# Patient Record
Sex: FEMALE | URBAN - METROPOLITAN AREA
[De-identification: names, ages, dates, MRNs, and addresses within clinical notes are randomized per-mention and may not be internally consistent; named-entity substitution may affect disease eponyms.]

---

## 2019-07-17 ENCOUNTER — APPOINTMENT (OUTPATIENT)
Age: 49
Setting detail: DERMATOLOGY
End: 2019-07-17

## 2019-07-17 DIAGNOSIS — Z41.9 ENCOUNTER FOR PROCEDURE FOR PURPOSES OTHER THAN REMEDYING HEALTH STATE, UNSPECIFIED: ICD-10-CM

## 2019-07-17 PROCEDURE — OTHER BOTOX: OTHER

## 2019-07-17 NOTE — PROCEDURE: BOTOX
Consent: Assessment :\\n“Romie Aesthetics Scales”\\n-Wrinkles= mild, Lines present at rest= mild-fine, Folds= mild, Volume Loss & Skin Laxity= mild.  \\n-Glogau Wrinkle Scale:  II-III\\n-Treatment areas reviewed with the patient while holding a hand-held mirror.  \\n-The patient has realistic expectations. \\n-Written consent obtained. Patient verbalized understanding. Questions answered. See written consent on file. \\n-Topical anesthesia  L23%T7% was applied for 10 minutes on upper lip;  Lot #[id number], exp 9/12/19 \\n-The skin was prepped with alcohol, skin markings made and injections provided. \\n-The treatment was administered to the area(s) above.\\n-The patient tolerated the procedure well w/o incident.  \\n-Additional product (Botox) may be necessary for optimal correction following treatment and/or maintenance.\\n\\n-Pt has naturally occurring heaviness in L brow compared to R.\\n-Patient instructed to not lie down for 4-6 hours.  Patient instructed not to massage or manipulate treatment areas (avoid facial treatments or Clarisonic Brush) and limit physical activity for 24 hours.\\n-Recommended:  Voluma temples, cheeks, chin and lateral jawline for structure.\\n-Instructed to avoid: NSAIDs, Vitamin E, Fish oil, Ginko Balboa and alcohol 5-7 days before filler treatment Consent: Assessment :\\n“Romie Aesthetics Scales”\\n-Wrinkles= mild, Lines present at rest= mild-fine, Folds= mild, Volume Loss & Skin Laxity= mild.  \\n-Glogau Wrinkle Scale:  II-III\\n-Treatment areas reviewed with the patient while holding a hand-held mirror.  \\n-The patient has realistic expectations. \\n-Written consent obtained. Patient verbalized understanding. Questions answered. See written consent on file. \\n-Topical anesthesia  L23%T7% was applied for 10 minutes on upper lip;  Lot #[id number], exp 9/12/19 \\n-The skin was prepped with alcohol, skin markings made and injections provided. \\n-The treatment was administered to the area(s) above.\\n-The patient tolerated the procedure well w/o incident.  \\n-Additional product (Botox) may be necessary for optimal correction following treatment and/or maintenance.\\n\\n-Pt has naturally occurring heaviness in L brow compared to R.\\n-Patient instructed to not lie down for 4-6 hours.  Patient instructed not to massage or manipulate treatment areas (avoid facial treatments or Clarisonic Brush) and limit physical activity for 24 hours.\\n-Recommended:  Voluma temples, cheeks, chin and lateral jawline for structure.\\n-Instructed to avoid: NSAIDs, Vitamin E, Fish oil, Ginko Balboa and alcohol 5-7 days before filler treatment

## 2019-10-15 ENCOUNTER — APPOINTMENT (OUTPATIENT)
Age: 49
Setting detail: DERMATOLOGY
End: 2019-10-15

## 2019-12-03 ENCOUNTER — APPOINTMENT (OUTPATIENT)
Age: 49
Setting detail: DERMATOLOGY
End: 2019-12-04

## 2019-12-03 DIAGNOSIS — Z41.9 ENCOUNTER FOR PROCEDURE FOR PURPOSES OTHER THAN REMEDYING HEALTH STATE, UNSPECIFIED: ICD-10-CM

## 2019-12-03 PROCEDURE — OTHER BOTOX: OTHER

## 2019-12-03 NOTE — PROCEDURE: BOTOX
Consent: Assessment :\\n“Romie Aesthetics Scales”\\n-Wrinkles= mild, Lines present at rest= mild-fine, Folds= mild, Volume Loss & Skin Laxity= mild.  \\n-Glogau Wrinkle Scale:  II-III\\n-Pt has naturally occurring heaviness in L brow compared to R.\\n\\n-Treatment areas reviewed with the patient while holding a hand-held mirror.  \\n-The patient has realistic expectations. \\n\\n-Informed patient that lower face Botox injections are considered off-label.\\n\\n-Written consent obtained. Patient verbalized understanding. Questions answered. See written consent on file. \\n\\n-Topical anesthesia  L23%T7% was applied for 10 minutes on upper lip;  Lot #[id number], exp 9/12/19 \\n-The skin was prepped with alcohol, skin markings made and injections provided. \\n-The treatment was administered to the area(s) noted.\\n-The patient tolerated the procedure well w/o incident.  \\n-Additional product (Botox) may be necessary for optimal correction following treatment and/or maintenance.\\n-Patient instructed to not lie down for 4-6 hours.  Patient instructed not to massage or manipulate treatment areas (avoid facial treatments or Clarisonic Brush) and limit physical activity for 24 hours.\\n\\n-Calipers used\\n\\n-Recommended:  Voluma temples, cheeks, chin and lateral jawline for structure.\\n-Instructed to avoid: NSAIDs, Vitamin E, Fish oil, Ginkgo Biloba  and alcohol 5-7 days before filler treatment\\n-Skin Care: Obagi hydroquinone, tretinoin Consent: Assessment :\\n“Romie Aesthetics Scales”\\n-Wrinkles= mild, Lines present at rest= mild-fine, Folds= mild, Volume Loss & Skin Laxity= mild.  \\n-Glogau Wrinkle Scale:  II-III\\n-Pt has naturally occurring heaviness in L brow compared to R.\\n\\n-Treatment areas reviewed with the patient while holding a hand-held mirror.  \\n-The patient has realistic expectations. \\n\\n-Informed patient that lower face Botox injections are considered off-label.\\n\\n-Written consent obtained. Patient verbalized understanding. Questions answered. See written consent on file. \\n\\n-Topical anesthesia  L23%T7% was applied for 10 minutes on upper lip;  Lot #[id number], exp 9/12/19 \\n-The skin was prepped with alcohol, skin markings made and injections provided. \\n-The treatment was administered to the area(s) noted.\\n-The patient tolerated the procedure well w/o incident.  \\n-Additional product (Botox) may be necessary for optimal correction following treatment and/or maintenance.\\n-Patient instructed to not lie down for 4-6 hours.  Patient instructed not to massage or manipulate treatment areas (avoid facial treatments or Clarisonic Brush) and limit physical activity for 24 hours.\\n\\n-Calipers used\\n\\n-Recommended:  Voluma temples, cheeks, chin and lateral jawline for structure.\\n-Instructed to avoid: NSAIDs, Vitamin E, Fish oil, Ginkgo Biloba  and alcohol 5-7 days before filler treatment\\n-Skin Care: Obagi hydroquinone, tretinoin

## 2020-02-12 ENCOUNTER — APPOINTMENT (OUTPATIENT)
Age: 50
Setting detail: DERMATOLOGY
End: 2020-02-12

## 2020-02-12 DIAGNOSIS — Z41.9 ENCOUNTER FOR PROCEDURE FOR PURPOSES OTHER THAN REMEDYING HEALTH STATE, UNSPECIFIED: ICD-10-CM

## 2020-02-12 PROCEDURE — OTHER JUVEDERM VOLUMA XC INJECTION: OTHER

## 2020-02-12 NOTE — PROCEDURE: JUVEDERM VOLUMA XC INJECTION
Price (Use Numbers Only, No Special Characters Or $): 8455 Price (Use Numbers Only, No Special Characters Or $): 8518

## 2020-02-12 NOTE — PROCEDURE: JUVEDERM VOLUMA XC INJECTION
Consent: -Assessment: \\n(“Romie Aesthetics Scales”) Wrinkles= mild, Lines Present at Rest= mild, Folds= mild, Volume Loss & Skin Laxity=mild. \\nGlogau Wrinkle Scale= I-II \\n-Treatment areas reviewed with patient holding a hand held mirror. The patient has realistic expectations.  \\n-The patient was informed that the jawline, chin and tear troughs with the BD syringe micro-droplet technique are off-label.\\n-Calipers used\\n-Written consent obtained. Questions answered. Patient verbalized understanding of the content.  See written informed consent on file. \\n-The skin was prepped with with alcohol and Puracyn prior to treatment. Skin markings made.\\n-The filler was administered to the treatment areas noted.  \\n-Aseptic technique was maintained throughout treatment with Chlorhexadine  and Puracyn wipes.\\n-The patient tolerated the procedure well w/o incident. \\n-Additional product (hyaluronic ) may be necessary for optimal correction following treatment and/or maintenance.\\n-Ice provided post treatment for 2 to 5 minutes and or to take home. \\n-Patient instructed to apply ice 20 minutes on, 20 minutes off while awake for one to 1 to 2 days to reduce swelling. Avoid massage in the area. An over-the-counter antihistamine may be beneficial and was recommended to help with swelling. Patient instructed to notify clinic if any bruising worsens, travels or becomes painful.\\n-Skin Care:  recommended and sample provided of the Epionce Renewal Lite lotion.\\n-Discussed other treatment recommendations for neck area.  She has done a series of Kybella, I recommended 1 more vial submental to clean any remaining fat that could be causing the some of the minor fullness.  I had also suggested trying 10-15 units medially in playtsmal bands and Thermage every 2-3 years for skin tightening of the neck and jawline following these other recommendations.\\n-Plan 2nd syringe of Voluma along lateral jawline in the near future. \\n -Instructed to avoid NSAIDS, Vitamin E, Fish Oil, Ginkgo Biloba and alcohol 5-7 days before filler treatment.\\n-20% [employer] discount \\n-2nd syringe of Voluma; used 0.45mL - house goods N/C

## 2020-02-25 ENCOUNTER — APPOINTMENT (OUTPATIENT)
Age: 50
Setting detail: DERMATOLOGY
End: 2020-02-25

## 2020-06-04 ENCOUNTER — APPOINTMENT (OUTPATIENT)
Age: 50
Setting detail: DERMATOLOGY
End: 2020-06-04

## 2020-06-04 DIAGNOSIS — Z41.9 ENCOUNTER FOR PROCEDURE FOR PURPOSES OTHER THAN REMEDYING HEALTH STATE, UNSPECIFIED: ICD-10-CM

## 2020-06-04 PROCEDURE — OTHER BOTOX: OTHER

## 2020-06-04 NOTE — PROCEDURE: BOTOX
Right Periorbital Units: 0
Show Additional Area 3: Yes
Show Ucl Units: No
Additional Area 2 Location: Nasalis
Dilution (U/0.1 Cc): 4
Additional Area 3 Location: [facility]
Detail Level: Detailed
Additional Area 6 Location: TOTAL UNITS
Additional Area 5 Location: [facility]
Additional Area 4 Location: Lips (O.O.)
Additional Area 1 Location: \"Chemical Brow Lift\"
Additional Area 6 Units: 100
Consent: ***DOCUMENTED ON PAPER _ SEE SCANNED DOCUMENTS\\n\\nBOTOX  - Treatment Performed Today.
Additional Area 5 Units: 3

## 2021-05-28 ENCOUNTER — RECORDS - HEALTHEAST (OUTPATIENT)
Dept: ADMINISTRATIVE | Facility: CLINIC | Age: 51
End: 2021-05-28

## 2023-12-04 ENCOUNTER — HOSPITAL ENCOUNTER (EMERGENCY)
Facility: CLINIC | Age: 53
Discharge: HOME | End: 2023-12-04
Attending: EMERGENCY MEDICINE | Admitting: EMERGENCY MEDICINE
Payer: COMMERCIAL

## 2023-12-04 ENCOUNTER — APPOINTMENT (OUTPATIENT)
Dept: CT IMAGING | Facility: CLINIC | Age: 53
End: 2023-12-04
Attending: EMERGENCY MEDICINE
Payer: COMMERCIAL

## 2023-12-04 VITALS
RESPIRATION RATE: 16 BRPM | BODY MASS INDEX: 21.47 KG/M2 | TEMPERATURE: 98.1 F | HEIGHT: 70 IN | WEIGHT: 150 LBS | SYSTOLIC BLOOD PRESSURE: 120 MMHG | OXYGEN SATURATION: 100 % | HEART RATE: 71 BPM | DIASTOLIC BLOOD PRESSURE: 65 MMHG

## 2023-12-04 DIAGNOSIS — N12 PYELITIS: ICD-10-CM

## 2023-12-04 LAB
ALBUMIN SERPL BCG-MCNC: 4.2 G/DL (ref 3.5–5.2)
ALBUMIN UR-MCNC: 10 MG/DL
ALP SERPL-CCNC: 65 U/L (ref 40–150)
ALT SERPL W P-5'-P-CCNC: 14 U/L (ref 0–50)
ANION GAP SERPL CALCULATED.3IONS-SCNC: 9 MMOL/L (ref 7–15)
APPEARANCE UR: CLEAR
AST SERPL W P-5'-P-CCNC: 18 U/L (ref 0–45)
BILIRUB SERPL-MCNC: 0.2 MG/DL
BILIRUB UR QL STRIP: NEGATIVE
BUN SERPL-MCNC: 11.8 MG/DL (ref 6–20)
CALCIUM SERPL-MCNC: 8.9 MG/DL (ref 8.6–10)
CHLORIDE SERPL-SCNC: 103 MMOL/L (ref 98–107)
COLOR UR AUTO: ABNORMAL
CREAT SERPL-MCNC: 0.77 MG/DL (ref 0.51–0.95)
DEPRECATED HCO3 PLAS-SCNC: 26 MMOL/L (ref 22–29)
EGFRCR SERPLBLD CKD-EPI 2021: >90 ML/MIN/1.73M2
GLUCOSE SERPL-MCNC: 98 MG/DL (ref 70–99)
GLUCOSE UR STRIP-MCNC: NEGATIVE MG/DL
HGB UR QL STRIP: ABNORMAL
KETONES UR STRIP-MCNC: NEGATIVE MG/DL
LEUKOCYTE ESTERASE UR QL STRIP: ABNORMAL
NITRATE UR QL: NEGATIVE
PH UR STRIP: 6 [PH] (ref 5–7)
POTASSIUM SERPL-SCNC: 3.8 MMOL/L (ref 3.4–5.3)
PROT SERPL-MCNC: 7.3 G/DL (ref 6.4–8.3)
RBC URINE: 6 /HPF
SODIUM SERPL-SCNC: 138 MMOL/L (ref 135–145)
SP GR UR STRIP: 1 (ref 1–1.03)
SQUAMOUS EPITHELIAL: <1 /HPF
UROBILINOGEN UR STRIP-MCNC: NORMAL MG/DL
WBC URINE: 108 /HPF

## 2023-12-04 PROCEDURE — 250N000013 HC RX MED GY IP 250 OP 250 PS 637: Performed by: EMERGENCY MEDICINE

## 2023-12-04 PROCEDURE — 96365 THER/PROPH/DIAG IV INF INIT: CPT | Mod: 59

## 2023-12-04 PROCEDURE — 36415 COLL VENOUS BLD VENIPUNCTURE: CPT | Performed by: EMERGENCY MEDICINE

## 2023-12-04 PROCEDURE — 250N000011 HC RX IP 250 OP 636: Performed by: EMERGENCY MEDICINE

## 2023-12-04 PROCEDURE — 99285 EMERGENCY DEPT VISIT HI MDM: CPT | Mod: 25

## 2023-12-04 PROCEDURE — 87086 URINE CULTURE/COLONY COUNT: CPT | Performed by: EMERGENCY MEDICINE

## 2023-12-04 PROCEDURE — 96361 HYDRATE IV INFUSION ADD-ON: CPT

## 2023-12-04 PROCEDURE — 96375 TX/PRO/DX INJ NEW DRUG ADDON: CPT

## 2023-12-04 PROCEDURE — 80053 COMPREHEN METABOLIC PANEL: CPT | Performed by: EMERGENCY MEDICINE

## 2023-12-04 PROCEDURE — 258N000003 HC RX IP 258 OP 636: Performed by: EMERGENCY MEDICINE

## 2023-12-04 PROCEDURE — 250N000011 HC RX IP 250 OP 636: Mod: JZ | Performed by: EMERGENCY MEDICINE

## 2023-12-04 PROCEDURE — 250N000009 HC RX 250: Performed by: EMERGENCY MEDICINE

## 2023-12-04 PROCEDURE — 74177 CT ABD & PELVIS W/CONTRAST: CPT

## 2023-12-04 PROCEDURE — 81001 URINALYSIS AUTO W/SCOPE: CPT | Performed by: EMERGENCY MEDICINE

## 2023-12-04 RX ORDER — ONDANSETRON 2 MG/ML
4 INJECTION INTRAMUSCULAR; INTRAVENOUS ONCE
Status: COMPLETED | OUTPATIENT
Start: 2023-12-04 | End: 2023-12-04

## 2023-12-04 RX ORDER — KETOROLAC TROMETHAMINE 15 MG/ML
15 INJECTION, SOLUTION INTRAMUSCULAR; INTRAVENOUS ONCE
Status: COMPLETED | OUTPATIENT
Start: 2023-12-04 | End: 2023-12-04

## 2023-12-04 RX ORDER — CEFTRIAXONE 2 G/1
2 INJECTION, POWDER, FOR SOLUTION INTRAMUSCULAR; INTRAVENOUS ONCE
Status: COMPLETED | OUTPATIENT
Start: 2023-12-04 | End: 2023-12-04

## 2023-12-04 RX ORDER — ACETAMINOPHEN 325 MG/1
975 TABLET ORAL ONCE
Status: COMPLETED | OUTPATIENT
Start: 2023-12-04 | End: 2023-12-04

## 2023-12-04 RX ORDER — CEFPODOXIME PROXETIL 200 MG/1
200 TABLET, FILM COATED ORAL 2 TIMES DAILY
Qty: 20 TABLET | Refills: 0 | Status: SHIPPED | OUTPATIENT
Start: 2023-12-04 | End: 2023-12-14

## 2023-12-04 RX ORDER — ONDANSETRON 4 MG/1
4 TABLET, ORALLY DISINTEGRATING ORAL EVERY 8 HOURS PRN
Qty: 10 TABLET | Refills: 0 | Status: SHIPPED | OUTPATIENT
Start: 2023-12-04 | End: 2023-12-07

## 2023-12-04 RX ORDER — IOPAMIDOL 755 MG/ML
75 INJECTION, SOLUTION INTRAVASCULAR ONCE
Status: COMPLETED | OUTPATIENT
Start: 2023-12-04 | End: 2023-12-04

## 2023-12-04 RX ORDER — KETOROLAC TROMETHAMINE 15 MG/ML
15 INJECTION, SOLUTION INTRAMUSCULAR; INTRAVENOUS ONCE
Status: DISCONTINUED | OUTPATIENT
Start: 2023-12-04 | End: 2023-12-04

## 2023-12-04 RX ADMIN — SODIUM CHLORIDE 1000 ML: 9 INJECTION, SOLUTION INTRAVENOUS at 17:44

## 2023-12-04 RX ADMIN — KETOROLAC TROMETHAMINE 15 MG: 15 INJECTION, SOLUTION INTRAMUSCULAR; INTRAVENOUS at 17:43

## 2023-12-04 RX ADMIN — ONDANSETRON 4 MG: 2 INJECTION INTRAMUSCULAR; INTRAVENOUS at 17:43

## 2023-12-04 RX ADMIN — CEFTRIAXONE SODIUM 2 G: 2 INJECTION, POWDER, FOR SOLUTION INTRAMUSCULAR; INTRAVENOUS at 20:41

## 2023-12-04 RX ADMIN — IOPAMIDOL 75 ML: 755 INJECTION, SOLUTION INTRAVENOUS at 19:09

## 2023-12-04 RX ADMIN — SODIUM CHLORIDE 62 ML: 9 INJECTION, SOLUTION INTRAVENOUS at 19:09

## 2023-12-04 RX ADMIN — ACETAMINOPHEN 975 MG: 325 TABLET, FILM COATED ORAL at 20:36

## 2023-12-04 ASSESSMENT — ACTIVITIES OF DAILY LIVING (ADL)
ADLS_ACUITY_SCORE: 35
ADLS_ACUITY_SCORE: 35

## 2023-12-04 NOTE — ED NOTES
Tele-PIT/Intake Evaluation      Video-Visit Details    Type of service:  Video Visit    Video Start Time (time video started): 5:26 PM  Video End Time (time video stopped): 5:35 PM   Originating Location (pt. Location):  [facility]  Distant Location (provider location):  [facility]  Mode of Communication:  Video Conference via [email or web address]  Patient verbally consented to [facility] televisit.    History:  Right lower quadrant pain starting last evening, was seen by her primary care provider and CBC drawn demonstrating a white blood cell count of 12, referred here for rule out appendicitis    Exam:  General:  Alert, interactive  Cardiovascular:  Well perfused  Lungs:  No respiratory distress, no accessory muscle use  Neuro:  Moving all 4 extremities  Skin:  Warm, dry  Psych:  Normal affect   No data found.    Appropriate interventions for symptom management were initiated if applicable.  Appropriate diagnostic tests were initiated if indicated.    Important information for subsequent clinician:  CT scan of the abdomen pelvis ordered    I briefly evaluated the patient and developed an initial plan of care. I discussed this plan and explained that this brief interaction does not constitute a full evaluation. Patient/family understands that they should wait to be fully evaluated and discuss any test results with another clinician prior to leaving the hospital.       Trigger, Jordon Leyva MD  12/04/23 8933

## 2023-12-04 NOTE — ED TRIAGE NOTES
Patient presents with complaints of RLQ abdominal pain that started a few days ago. Patient was seen at , had UA and UPT performed in addition to some blood tests. Blood tests appear normal, UPT negative, UA positive for leukocyte esterase. Patient was referred to the ED to rule out appendicitis.      Triage Assessment (Adult)       Row Name 12/04/23 1737          Triage Assessment    Airway WDL WDL        Respiratory WDL    Respiratory WDL WDL        Skin Circulation/Temperature WDL    Skin Circulation/Temperature WDL WDL        Cardiac WDL    Cardiac WDL WDL        Peripheral/Neurovascular WDL    Peripheral Neurovascular WDL WDL        Cognitive/Neuro/Behavioral WDL    Cognitive/Neuro/Behavioral WDL WDL

## 2023-12-05 LAB — BACTERIA UR CULT: ABNORMAL

## 2023-12-05 NOTE — ED PROVIDER NOTES
"    History     Chief Complaint:  Abdominal Pain (RLQ)       KIM Olguin is a 53 year old female presenting with right lower quadrant pain, this developed gradually over the last 24 to 36 hours.  She was seen by her primary care provider just before arrival here and had a CBC demonstrating white blood cell count of 12 and 10-20 white blood cells in her urine.  Given the right lower quadrant nature there was concern for appendicitis.  She relays nausea denies fevers.  No further aggravating or alleviating factors no further associated symptoms.      Independent Historian:    None    Review of External Notes:  Blood blood cell count from the primary care office was 12  Pregnancy test from the primary care office was negative    Medications:    DULoxetine HCl (CYMBALTA PO)  PAXIL CR 25 MG OR TB24        Past Medical History:    Past Medical History:   Diagnosis Date    NONSPECIFIC MEDICAL HISTORY 9/02    Screening for malignant neoplasm of the cervix 1998       Past Surgical History:    Past Surgical History:   Procedure Laterality Date    [facility] SURGICAL PATHOLOGY      DILATION AND CURETTAGE SUCTION  8/30/2013    Procedure: DILATION AND CURETTAGE SUCTION;  Suction Dilation and Curettage, Placement of IUD;  Surgeon: Asuncion Liu MD;  Location: [facility] OR    HC REMOVAL OF TONSILS,<13 Y/O      INSERT INTRAUTERINE DEVICE  8/30/2013    Procedure: INSERT INTRAUTERINE DEVICE;;  Surgeon: Asuncion Liu MD;  Location: [facility] OR    IR LUMBAR EPIDURAL STEROID INJECTION  2/7/2005    [facility] NONSPECIFIC PROCEDURE      microdisk back    [facility] NONSPECIFIC PROCEDURE      vocal cord polyp removal          Physical Exam   Patient Vitals for the past 24 hrs:   BP Temp Temp src Pulse Resp SpO2 Height Weight   12/04/23 1735 132/67 98.1  F (36.7  C) Temporal 76 16 100 % 1.778 m (5' 10\") 68 kg (150 lb)        Physical Exam  General: Alert, interactive in mild distress  Head:  Scalp is atraumatic  Eyes:  The pupils are equal, round, and " reactive to light    EOM's intact    No scleral icterus  ENT:      Nose:  The external nose is normal  Ears:  External ears are normal      Neck:  Normal range of motion.      There is no rigidity.    Trachea is in the midline         CV:  Regular rate and rhythm    No murmur   Resp:  Breath sounds are clear bilaterally    Non-labored, no retractions or accessory muscle use      GI:  Abdomen is soft, no distension, Right lower quadrant tenderness.       MS:  Normal strength in all 4 extremities, mild right CVA tenderness  Skin:  Warm and dry, No rash or lesions noted.    Psych: Awake. Alert.  Normal affect.      Appropriate interactions.    Emergency Department Course   Imaging:  CT Abdomen Pelvis w Contrast   Final Result   IMPRESSION:    1.  Findings suspicious for right sided pyelitis. Correlate with urinalysis.        Report per radiology    Laboratory:  Labs Ordered and Resulted from Time of ED Arrival to Time of ED Departure   ROUTINE UA WITH MICROSCOPIC REFLEX TO CULTURE - Abnormal       Result Value    Color Urine Light Yellow      Appearance Urine Clear      Glucose Urine Negative      Bilirubin Urine Negative      Ketones Urine Negative      Specific Gravity Urine 1.005      Blood Urine Trace (*)     pH Urine 6.0      Protein Albumin Urine 10 (*)     Urobilinogen Urine Normal      Nitrite Urine Negative      Leukocyte Esterase Urine Large (*)     RBC Urine 6 (*)     WBC Urine 108 (*)     Squamous Epithelials Urine <1     COMPREHENSIVE METABOLIC PANEL - Normal    Sodium 138      Potassium 3.8      Carbon Dioxide (CO2) 26      Anion Gap 9      Urea Nitrogen 11.8      Creatinine 0.77      GFR Estimate >90      Calcium 8.9      Chloride 103      Glucose 98      Alkaline Phosphatase 65      AST 18      ALT 14      Protein Total 7.3      Albumin 4.2      Bilirubin Total 0.2     URINE CULTURE        Procedures   None    Emergency Department Course & Assessments:    Interventions:  Medications   ondansetron  (ZOFRAN) injection 4 mg (4 mg Intravenous $Given 12/4/23 1743)   sodium chloride 0.9% BOLUS 1,000 mL (1,000 mLs Intravenous $New Bag 12/4/23 1744)   ketorolac (TORADOL) injection 15 mg (15 mg Intravenous $Given 12/4/23 1743)   iopamidol (ISOVUE-370) solution 75 mL (75 mLs Intravenous $Given 12/4/23 1909)   sodium chloride 0.9 % bag 100mL (62 mLs Intravenous $Given 12/4/23 1909)   cefTRIAXone (ROCEPHIN) 2 g vial to attach to  ml bag for ADULTS or NS 50 ml bag for PEDS (2 g Intravenous $New Bag 12/4/23 2041)   acetaminophen (TYLENOL) tablet 975 mg (975 mg Oral $Given 12/4/23 2036)        Assessments:  Patient was initially evaluated via tele and then subsequently at the bedside.    Independent Interpretation (X-rays, CTs, rhythm strip):  None    Consultations/Discussion of Management or Tests:  None       Social Determinants of Health affecting care:  None     Disposition:  The patient was discharged to home.     Impression & Plan      Medical Decision Making:  Following presentation history and physical examination were performed, the above workup was undertaken.  Findings are consistent with UTI extending into the ureter i.e. pyelitis, no signs of pyelonephritis at this time, no signs of severe sepsis or septic shock.  She received IV antibiotics here and will be discharged home with medications as noted below.  Laboratory workup is otherwise unremarkable, patient feels comfortable with this plan of action and she was subsequently discharged.  She will return if new symptoms develop.  I recommended close follow-up with her primary care provider.    Diagnosis:    ICD-10-CM    1. Pyelitis  N12            Discharge Medications:  New Prescriptions    CEFPODOXIME (VANTIN) 200 MG TABLET    Take 1 tablet (200 mg) by mouth 2 times daily for 10 days    ONDANSETRON (ZOFRAN ODT) 4 MG ODT TAB    Take 1 tablet (4 mg) by mouth every 8 hours as needed          Jordon Grullon MD  12/4/2023   Jordon Grullon,*               Trigger, Jodron Leyva MD  12/04/23 1390

## 2023-12-06 ENCOUNTER — TELEPHONE (OUTPATIENT)
Dept: EMERGENCY MEDICINE | Facility: CLINIC | Age: 53
End: 2023-12-06
Payer: COMMERCIAL

## 2023-12-06 NOTE — TELEPHONE ENCOUNTER
[facility] Emergency Department/Urgent Care Lab result notification:    Reason for call  Notify the patient/parent of lab results  Assess patient symptoms (if applicable)  Review ED providers recommendations/discharge instructions (if necessary)  Advise per [facility] ED lab result protocol    Lab result      Attempted to call patient but call went to voicemail and unable to leave a message.     Regan Victoria RN  Customer Service Center Result RN  [facility] Emergency Dept Lab Result RN  Ph# [phone]

## 2024-01-25 ENCOUNTER — TRANSFERRED RECORDS (OUTPATIENT)
Dept: HEALTH INFORMATION MANAGEMENT | Facility: CLINIC | Age: 54
End: 2024-01-25
Payer: COMMERCIAL

## 2024-01-30 ENCOUNTER — OFFICE VISIT (OUTPATIENT)
Dept: SURGERY | Facility: CLINIC | Age: 54
End: 2024-01-30
Payer: COMMERCIAL

## 2024-01-30 VITALS
OXYGEN SATURATION: 95 % | SYSTOLIC BLOOD PRESSURE: 122 MMHG | RESPIRATION RATE: 16 BRPM | HEIGHT: 70 IN | WEIGHT: 153 LBS | HEART RATE: 82 BPM | BODY MASS INDEX: 21.9 KG/M2 | DIASTOLIC BLOOD PRESSURE: 84 MMHG

## 2024-01-30 DIAGNOSIS — K80.20 SYMPTOMATIC CHOLELITHIASIS: Primary | ICD-10-CM

## 2024-01-30 PROCEDURE — 99204 OFFICE O/P NEW MOD 45 MIN: CPT | Performed by: SURGERY

## 2024-01-30 RX ORDER — DEXTROAMPHETAMINE SACCHARATE, AMPHETAMINE ASPARTATE, DEXTROAMPHETAMINE SULFATE AND AMPHETAMINE SULFATE 5; 5; 5; 5 MG/1; MG/1; MG/1; MG/1
20 TABLET ORAL
COMMUNITY

## 2024-01-30 NOTE — LETTER
February 1, 2024          Zascott Durant  [address]  [place] [address]      RE:   Radha Olguin 1970      Dear Colleague,    Thank you for referring your patient, Radha Olguin, to [facility]. Please see a copy of my visit note below.    Chief Complaint:  Right upper quadrant pain, nausea     History of Present Illness:  Radha Olguin is a 53 year old female who presented with several episodes of upper abdominal pain and intermittent nausea, usually after eating.  Commonly associated with eating greasy foods.  Was recently seen in the emergency department and admitted to [facility] with a urinary tract infection as well as inflammation of the ureter.  She was hospitalized for 3 days and treated with antibiotics.  It appears that she has had complete recovery, but continues to have right upper quadrant pain that radiates toward the bellybutton.  The patient is now here to discuss diagnosis and management options.      Assessment/plan:  Radha Olguin is a 53 year old female with signs and symptoms suggesting symptomatic cholelithiasis. I've recommended robotic or laparoscopic cholecystectomy. Surgical comorbidities include history of urinary tract infection.  We discussed the fact that her recent urinary tract infection may still be causing lingering right upper quadrant pain and actually be the explanation for her colicky symptoms.  Given the number of stones and there is size, I do think there is a good chance that cholecystectomy will address her pain and symptoms.  I feel the patient is a good candidate for the surgery and that this should be able to be done as an outpatient. They were going to consider their options and likely schedule surgery.    Again, thank you for allowing me to participate in the care of your patient.      Sincerely,      Deniz Pompa MD

## 2024-01-31 ENCOUNTER — HOSPITAL ENCOUNTER (OUTPATIENT)
Facility: CLINIC | Age: 54
End: 2024-01-31
Attending: SURGERY | Admitting: SURGERY
Payer: COMMERCIAL

## 2024-01-31 ENCOUNTER — TELEPHONE (OUTPATIENT)
Dept: SURGERY | Facility: CLINIC | Age: 54
End: 2024-01-31
Payer: COMMERCIAL

## 2024-01-31 ENCOUNTER — PREP FOR PROCEDURE (OUTPATIENT)
Dept: SURGERY | Facility: CLINIC | Age: 54
End: 2024-01-31
Payer: COMMERCIAL

## 2024-01-31 PROBLEM — K80.20 SYMPTOMATIC CHOLELITHIASIS: Status: ACTIVE | Noted: 2024-01-31

## 2024-01-31 NOTE — TELEPHONE ENCOUNTER
Type of surgery: laparoscopic cholecystectomy  Location of surgery: [facility]  Date and time of surgery: 2/21/24 11:00am  Surgeon: Dr Pompa  Pre-Op Appt Date: pt to schedule  Post-Op Appt Date: pt to schedule   Packet sent out: Yes  Pre-cert/Authorization completed:  Not Applicable  Date: 1/31/24

## 2024-01-31 NOTE — TELEPHONE ENCOUNTER
Orders received for robot assisted laparoscopic cholecystectomy (could also be laparoscopic) with Dr Pompa, I left a message for the patient to return my call to schedule surgery.

## 2024-01-31 NOTE — PROGRESS NOTES
"Surgery Consultation, [facility]         Deniz Pompa MD, MD    Radha Olguin MRN# [id number]   YOB: 1970 Age: 53 year old     PCP:  [facility] [address] Family None    Chief Complaint:  Right upper quadrant pain, nausea    History of Present Illness:  Radha Olguin is a 53 year old female who presented with several episodes of upper abdominal pain and intermittent nausea, usually after eating.  Commonly associated with eating greasy foods.  Was recently seen in the emergency department and admitted to [facility] with a urinary tract infection as well as inflammation of the ureter.  She was hospitalized for 3 days and treated with antibiotics.  It appears that she has had complete recovery, but continues to have right upper quadrant pain that radiates toward the bellybutton.  The patient is now here to discuss diagnosis and management options.    PMH:  Radha Olguin  has a past medical history of NONSPECIFIC MEDICAL HISTORY (9/02) and Screening for malignant neoplasm of the cervix (1998).  PSH:  Radha Olguin  has a past surgical history that includes NONSPECIFIC PROCEDURE; SURGICAL PATHOLOGY; NONSPECIFIC PROCEDURE; REMOVAL OF TONSILS,<13 Y/O; Dilation and curettage suction (8/30/2013); Insert intrauterine device (8/30/2013); and IR Lumbar Epidural Steroid Injection (2/7/2005).    Home medications and allergies reviewed.    Social History:  Radha Olguin  reports that she has never smoked. She has never used smokeless tobacco. She reports current alcohol use. She reports that she does not use drugs.  Family History:  Radha Olguin family history includes C.A.D. in her father; Diabetes in her father; Hypertension in her father and mother.    ROS:  The 10 point Review of Systems is negative other than noted in the HPI.  No nausea or vomiting..    Physical Exam:  Blood pressure 122/84, pulse 82, resp. rate 16, height 1.778 m (5' 10\"), weight 69.4 kg (153 lb), SpO2 95%.  153 lbs 0 " oz  Tall thin healthy appearing female in no distress.  Patient has a pleasant affect, speaks without difficulty.   Pupils equal round and reactive to light.   No cervical lymphadenopathy or thyromegaly.   Lung fields clear, breathing comfortably.   Heart normal sinus rhythm.  No murmurs rubs or gallops.  Abdomen soft, nontender, nondistended.  Minimal tenderness in the right upper quadrant, no masses appreciated. No peritoneal signs or rebound.  Skin warm, dry, without rashes or lesions.    All new lab and imaging data was reviewed.  Abdominal ultrasound shows gallbladder with multiple stones including a large stone at the neck     Assessment/plan:  Radha Olguin is a 53 year old female with signs and symptoms suggesting symptomatic cholelithiasis. I've recommended robotic or laparoscopic cholecystectomy. Surgical comorbidities include history of urinary tract infection.  We discussed the fact that her recent urinary tract infection may still be causing lingering right upper quadrant pain and actually be the explanation for her colicky symptoms.  Given the number of stones and there is size, I do think there is a good chance that cholecystectomy will address her pain and symptoms.  I feel the patient is a good candidate for the surgery and that this should be able to be done as an outpatient. They were going to consider their options and likely schedule surgery.    Jan Pompa M.D.  [facility]  [phone]    Please route or send letter to:  Primary Care Provider (PCP) and Referring Provider